# Patient Record
Sex: FEMALE | Race: AMERICAN INDIAN OR ALASKA NATIVE
[De-identification: names, ages, dates, MRNs, and addresses within clinical notes are randomized per-mention and may not be internally consistent; named-entity substitution may affect disease eponyms.]

---

## 2020-07-11 ENCOUNTER — HOSPITAL ENCOUNTER (EMERGENCY)
Dept: HOSPITAL 41 - JD.ED | Age: 35
Discharge: HOME | End: 2020-07-11
Payer: COMMERCIAL

## 2020-07-11 DIAGNOSIS — S01.511A: Primary | ICD-10-CM
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
## 2020-07-11 NOTE — EDM.PDOC
ED HPI GENERAL MEDICAL PROBLEM





- General


Chief Complaint: Laceration


Stated Complaint: LIP LAC


Time Seen by Provider: 07/11/20 11:21


Source of Information: Reports: Patient


History Limitations: Reports: No Limitations





- History of Present Illness


INITIAL COMMENTS - FREE TEXT/NARRATIVE: 





Patient is a 34-year-old female who presents to the emergency department with 

complaints of a laceration to her right upper lip.  States she was playing 

softball and was hit in the mouth with another player's elbow.  She is unsure 

when her last tetanus vaccination was.





- Related Data


                                    Allergies











Allergy/AdvReac Type Severity Reaction Status Date / Time


 


No Known Allergies Allergy   Verified 07/11/20 11:23











Home Meds: 


                                    Home Meds





. [No Known Home Meds]  07/11/20 [History]











Past Medical History





- Past Surgical History


Musculoskeletal Surgical History: Reports: Other (See Below)


Other Musculoskeletal Surgeries/Procedures:: ACL repair





Social & Family History





- Tobacco Use


Smoking Status *Q: Never Smoker


Second Hand Smoke Exposure: No





- Caffeine Use


Caffeine Use: Reports: Coffee





- Recreational Drug Use


Recreational Drug Use: No





ED ROS GENERAL





- Review of Systems


Review Of Systems: Comprehensive ROS is negative, except as noted in HPI.





ED EXAM, SKIN/RASH


Exam: See Below


Exam Limited By: No Limitations


General Appearance: Alert, WD/WN, No Apparent Distress


Respiratory/Chest: No Respiratory Distress, Lungs Clear, Normal Breath Sounds, 

No Accessory Muscle Use, Chest Non-Tender


Cardiovascular: Normal Peripheral Pulses, Regular Rate, Rhythm, No Edema, No 

Gallop, No JVD, No Murmur, No Rub


Neurological: Alert, Oriented, CN II-XII Intact, Normal Cognition, Normal Gait, 

Normal Reflexes, No Motor/Sensory Deficits


Psychiatric: Normal Affect, Normal Mood


Skin: Other (1 cm well approximated laceration to the right upper lip.  It is 

not gaping.  Scant bleeding.)





ED SKIN PROCEDURES





- Laceration/Wound Repair


  ** right upper lip


Appearance: Subcutaneous


Skin Prep: Chlorhexidine (Hibiciens), Saline


Exploration/Debridement/Repair: Wound Explored, No Foreign Material Found


Closed with: Wound Adhesive


Lac/Wound length In cm: 1


Tetanus Status Addressed: Yes (Patient declined tetanus vaccination.  Stated she

 will go to the clinic when she gets home to have it as she does not want have a

 sore arm while playing softball.)


Complications: No





Course





- Vital Signs


Last Recorded V/S: 


                                Last Vital Signs











Temp  97.7 F   07/11/20 11:21


 


Pulse  65   07/11/20 11:21


 


Resp  16   07/11/20 11:21


 


BP  110/75   07/11/20 11:21


 


Pulse Ox  98   07/11/20 11:21














- Orders/Labs/Meds


Orders: 


                               Active Orders 24 hr











 Category Date Time Status


 


 Vaccines to be Administered [RC] PER UNIT ROUTINE Care  07/11/20 11:44 Active











Meds: 


Medications














Discontinued Medications














Generic Name Dose Route Start Last Admin





  Trade Name Sangita  PRN Reason Stop Dose Admin


 


Diphtheria/Tetanus/Acell Pertussis  0.5 ml  07/11/20 11:44 





  Adacel  IM  07/11/20 11:45 





  .ONCE ONE  














Departure





- Departure


Time of Disposition: 12:16


Disposition: Home, Self-Care 01


Condition: Good


Clinical Impression: 


 Laceration








- Discharge Information


*PRESCRIPTION DRUG MONITORING PROGRAM REVIEWED*: No


*COPY OF PRESCRIPTION DRUG MONITORING REPORT IN PATIENT IDA: No


Instructions:  Sutures, Staples, or Adhesive Wound Closure, Easy-to-Read


Referrals: 


PCP,None [Primary Care Provider] - 


Forms:  ED Department Discharge


Additional Instructions: 


You were seen in the emergency department today for a 1 cm laceration to your 

right upper lip.  Wound was cleansed and closed with glue.  This will fall off 

over the next 3 to 5 days.  Ensure that you do not pick at the glue.  Watch for 

signs of infection including increased redness, swelling, purulent drainage.  If

these should occur, you should be seen either in the clinic or in the emergency 

department.  We would recommend that you get a tetanus vaccination upon 

returning home.  Return to the ER as needed.





Sepsis Event Note (ED)





- Evaluation


Sepsis Screening Result: No Definite Risk





- Focused Exam


Vital Signs: 


                                   Vital Signs











  Temp Pulse Resp BP Pulse Ox


 


 07/11/20 11:21  97.7 F  65  16  110/75  98














- My Orders


Last 24 Hours: 


My Active Orders





07/11/20 11:44


Vaccines to be Administered [RC] PER UNIT ROUTINE 














- Assessment/Plan


Last 24 Hours: 


My Active Orders





07/11/20 11:44


Vaccines to be Administered [RC] PER UNIT ROUTINE